# Patient Record
Sex: MALE
[De-identification: names, ages, dates, MRNs, and addresses within clinical notes are randomized per-mention and may not be internally consistent; named-entity substitution may affect disease eponyms.]

---

## 2020-09-28 PROBLEM — Z00.00 ENCOUNTER FOR PREVENTIVE HEALTH EXAMINATION: Status: ACTIVE | Noted: 2020-09-28

## 2020-09-29 VITALS — BODY MASS INDEX: 23.62 KG/M2 | WEIGHT: 165 LBS | HEIGHT: 70 IN

## 2020-09-29 DIAGNOSIS — F17.210 NICOTINE DEPENDENCE, CIGARETTES, UNCOMPLICATED: ICD-10-CM

## 2020-09-29 NOTE — HISTORY OF PRESENT ILLNESS
[TextBox_13] : Referred by Yonny Ortiz NP.\par \par Mr. EARL is a 55 year old male with right lung collapse as a teen.\par \par He was called to review eligibility for Low-Dose CT lung cancer screening.  Reviewed and confirmed that the patient meets screening eligibility criteria:\par \par 55 years old \par \par Smoking Status:  Current Smoker\par \par Number of pack(s) per day: 1\par Number of years smoked: 45\par Number of pack years smokin\par \par Mr. EARL denies any symptoms of lung cancer, including new cough, change in cough, hemoptysis, and unintentional weight loss.\par \par Mr. EARL denies any personal history of lung cancer.  No lung cancer in a first degree relative.  Denies any history of lung disease or any history of occupational exposures.

## 2020-10-05 ENCOUNTER — APPOINTMENT (OUTPATIENT)
Dept: CT IMAGING | Facility: CLINIC | Age: 55
End: 2020-10-05
Payer: COMMERCIAL

## 2020-10-05 ENCOUNTER — TRANSCRIPTION ENCOUNTER (OUTPATIENT)
Age: 55
End: 2020-10-05

## 2020-10-05 PROCEDURE — G0297: CPT
